# Patient Record
Sex: MALE | Race: OTHER | URBAN - METROPOLITAN AREA
[De-identification: names, ages, dates, MRNs, and addresses within clinical notes are randomized per-mention and may not be internally consistent; named-entity substitution may affect disease eponyms.]

---

## 2019-10-09 ENCOUNTER — EMERGENCY (EMERGENCY)
Facility: HOSPITAL | Age: 39
LOS: 0 days | Discharge: HOME | End: 2019-10-09
Attending: EMERGENCY MEDICINE | Admitting: EMERGENCY MEDICINE
Payer: SUBSIDIZED

## 2019-10-09 VITALS
TEMPERATURE: 96 F | RESPIRATION RATE: 18 BRPM | HEIGHT: 73 IN | WEIGHT: 184.97 LBS | SYSTOLIC BLOOD PRESSURE: 110 MMHG | HEART RATE: 105 BPM | OXYGEN SATURATION: 100 % | DIASTOLIC BLOOD PRESSURE: 64 MMHG

## 2019-10-09 DIAGNOSIS — Z88.8 ALLERGY STATUS TO OTHER DRUGS, MEDICAMENTS AND BIOLOGICAL SUBSTANCES STATUS: ICD-10-CM

## 2019-10-09 DIAGNOSIS — R07.9 CHEST PAIN, UNSPECIFIED: ICD-10-CM

## 2019-10-09 DIAGNOSIS — R04.2 HEMOPTYSIS: ICD-10-CM

## 2019-10-09 PROCEDURE — 99282 EMERGENCY DEPT VISIT SF MDM: CPT

## 2019-10-09 NOTE — ED ADULT NURSE NOTE - NSIMPLEMENTINTERV_GEN_ALL_ED
Implemented All Universal Safety Interventions:  [place] to call system. Call bell, personal items and telephone within reach. Instruct patient to call for assistance. Room bathroom lighting operational. Non-slip footwear when patient is off stretcher. Physically safe environment: no spills, clutter or unnecessary equipment. Stretcher in lowest position, wheels locked, appropriate side rails in place.

## 2019-10-09 NOTE — ED PROVIDER NOTE - OBJECTIVE STATEMENT
this is 38 yo male presents to ed for evaluation of chest pain . patient states that he needs pain meds. patient states his sickle cell is acting up. patient denies any shortness of breath. no nausea no vomiting

## 2019-10-09 NOTE — ED PROVIDER NOTE - PROGRESS NOTE DETAILS
patient refused ekg patient is demanding Iv dilaudid . patient is not wanting to change into a hospital gown patient is not at bedside . patient was seen by security walking out

## 2019-10-09 NOTE — ED ADULT TRIAGE NOTE - CHIEF COMPLAINT QUOTE
pt c/o diffuse CP that started today and coughing up blood, denies sob, describes CP as sharp and feels dizzy.

## 2019-10-09 NOTE — ED PROVIDER NOTE - NS ED ROS FT
Review of Systems:  	•	CONSTITUTIONAL - no fever, no diaphoresis, no chills  	•	SKIN - no rash  	•	HEMATOLOGIC - no bleeding, no bruising  	•	EYES - no eye pain, no blurry vision  	•	ENT - no change in hearing, no sore throat, no ear pain or tinnitus  	•	RESPIRATORY - no shortness of breath, no cough  	•	CARDIAC -  positive chest pain, no palpitations  	•	GI - no abd pain, no nausea, no vomiting, no diarrhea, no constipation  	•	GENITO-URINARY - no discharge, no dysuria; no hematuria, no increased urinary frequency  	•	MUSCULOSKELETAL - no joint paint, no swelling, no redness  	•	NEUROLOGIC - no weakness, no headache, no paresthesias, no LOC  	•	PSYCH - no anxiety, non suicidal, non homicidal, no hallucination, no depression

## 2019-10-09 NOTE — ED PROVIDER NOTE - CLINICAL SUMMARY MEDICAL DECISION MAKING FREE TEXT BOX
Pt refused EKG and blood draw.  States he just needs his pain medication. Explained to patient oral medication policy.  Pt wants injection.  He walked out

## 2019-10-09 NOTE — ED PROVIDER NOTE - ATTENDING CONTRIBUTION TO CARE
38 yo M PMHx sickle cell disease, follows at [facility] presents with c/o pain all over, pain to chest and coughing up blood. Pt states he is visiting from [place] for the death of a family member.  Staters that he needs his Hydroxyurea, Benadryl and Dilaudid.  no fevers or chills, no SOB.  On exam pt in NAD AAO x 3, seen ambulate with steady gait, OP clear, PERRL, abd is sft nt nd, Lungs cta b/l, + skin tattoos.